# Patient Record
Sex: FEMALE | Race: WHITE | NOT HISPANIC OR LATINO | Employment: STUDENT | URBAN - METROPOLITAN AREA
[De-identification: names, ages, dates, MRNs, and addresses within clinical notes are randomized per-mention and may not be internally consistent; named-entity substitution may affect disease eponyms.]

---
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
## 2021-03-10 ENCOUNTER — ANNUAL EXAM (OUTPATIENT)
Dept: OBGYN CLINIC | Facility: CLINIC | Age: 21
End: 2021-03-10
Payer: COMMERCIAL

## 2021-03-10 VITALS
WEIGHT: 153.6 LBS | DIASTOLIC BLOOD PRESSURE: 86 MMHG | HEIGHT: 68 IN | SYSTOLIC BLOOD PRESSURE: 120 MMHG | BODY MASS INDEX: 23.28 KG/M2

## 2021-03-10 DIAGNOSIS — Z30.41 ENCOUNTER FOR SURVEILLANCE OF CONTRACEPTIVE PILLS: ICD-10-CM

## 2021-03-10 DIAGNOSIS — Z01.419 ENCOUNTER FOR GYNECOLOGICAL EXAMINATION (GENERAL) (ROUTINE) WITHOUT ABNORMAL FINDINGS: Primary | ICD-10-CM

## 2021-03-10 PROCEDURE — 99395 PREV VISIT EST AGE 18-39: CPT | Performed by: NURSE PRACTITIONER

## 2021-03-10 PROCEDURE — G0145 SCR C/V CYTO,THINLAYER,RESCR: HCPCS | Performed by: NURSE PRACTITIONER

## 2021-03-10 RX ORDER — NORETHINDRONE AND ETHINYL ESTRADIOL 7 DAYS X 3
KIT ORAL
Qty: 90 TABLET | Refills: 3 | Status: SHIPPED | OUTPATIENT
Start: 2021-03-10 | End: 2022-03-22

## 2021-03-10 NOTE — PROGRESS NOTES
Encounter for gynecological examination (general) (routine) without abnormal findings    Normal findings on routine annual gyn exam  Recommended monthly SBE, annual CBE  Reviewed ASCCP guidelines and first pap collected today  The patient reports she completed Gardasil series  STI testing was offered and declined at this time; the patient is aware that condoms are recommended for all sexual contact for prevention of STI  The patient likes current contraceptive measure and desires to continue  Reviewed diet/activity recommendations and reasons to call  F/u in one year for routine annual gyn exam or sooner PRN  Encounter for surveillance of contraceptive pills    Happy with current OCP  Denies ACHES,  nausea or other side effects  Having BTB this month-denies missing any pills  Advised to call if happens with next pack for OCP change  Refill given for one year  RTO 1 year or prn problems or concerns  Diagnoses and all orders for this visit:    Encounter for gynecological examination (general) (routine) without abnormal findings  -     Liquid-based pap, screening    Encounter for surveillance of contraceptive pills  -     Necon 7/7/7 0 5/0 75/1-35 MG-MCG per tablet; Take one tablet by mouth daily         Elina Pfeiffer  2000    CC:  Yearly exam    S:  Macarena Whatley is a 23 y o  female here for yearly exam  She denies breast concerns, abdominal/pelvic pain, abnormal vaginal discharge or bladder/bowel dysfunction  Her cycles are regular, not heavy or painful on OCP  Having BTB this month-denies missing any pills  Sexual activity: She is sexually active without pain, bleeding or dryness  Contraception:  She uses OCP  for contraception  STD testing:  She does not request STD testing today  Gardasil:  She has had the Gardasil series         Last Pap: first today     Family hx of breast cancer:  PGM  Family hx of ovarian cancer: denies  Family hx of colon cancer: denies    She is graduating from [employer] this year and has a full time remote positron in [employer]      Current Outpatient Medications:     Necon 7/7/7 0 5/0 75/1-35 MG-MCG per tablet, Take one tablet by mouth daily, Disp: 90 tablet, Rfl: 3  Social History     Socioeconomic History    Marital status: Single     Spouse name: Not on file    Number of children: Not on file    Years of education: Not on file    Highest education level: Not on file   Occupational History    Not on file   Social Needs    Financial resource strain: Not on file    Food insecurity     Worry: Not on file     Inability: Not on file    Transportation needs     Medical: Not on file     Non-medical: Not on file   Tobacco Use    Smoking status: Never Smoker    Smokeless tobacco: Never Used   Substance and Sexual Activity    Alcohol use: Not Currently    Drug use: No    Sexual activity: Yes     Partners: Male     Birth control/protection: OCP   Lifestyle    Physical activity     Days per week: Not on file     Minutes per session: Not on file    Stress: Not on file   Relationships    Social connections     Talks on phone: Not on file     Gets together: Not on file     Attends Tenriism service: Not on file     Active member of club or organization: Not on file     Attends meetings of clubs or organizations: Not on file     Relationship status: Not on file    Intimate partner violence     Fear of current or ex partner: Not on file     Emotionally abused: Not on file     Physically abused: Not on file     Forced sexual activity: Not on file   Other Topics Concern    Not on file   Social History Narrative    Not on file     Family History   Problem Relation Age of Onset    Obesity Mother     No Known Problems Father     No Known Problems Sister     Diabetes Maternal Grandmother     Hyperlipidemia Maternal Grandmother     Hypertension Maternal Grandmother     Heart disease Maternal Grandfather     Breast cancer Paternal Grandmother    Mario Olivier Heart disease Paternal Grandfather      History reviewed  No pertinent past medical history  Review of Systems   Constitutional: Negative for appetite change, fatigue and unexpected weight change  Respiratory: Negative for shortness of breath  Cardiovascular: Negative for chest pain and leg swelling  Breasts:  negative for tenderness or masses  Gastrointestinal: Negative for abdominal pain  Endocrine: Negative for cold intolerance and heat intolerance  Genitourinary: Negative for dyspareunia, dysuria, flank pain, frequency, genital sores, hematuria, menstrual problem and pelvic pain  Musculoskeletal: Negative for back pain  Neurological: Negative for headaches  O:  Blood pressure 120/86, height 5' 8" (1 727 m), weight 69 7 kg (153 lb 9 6 oz), last menstrual period 02/17/2021  Patient appears well and is not in distress  ROM normal   Neck is supple without masses  Normal thyroid  Heart regular rate and rhythm  Lungs CTA bilaterally   Breasts are symmetrical without mass, tenderness, nipple discharge, skin changes or adenopathy  Abdomen is soft and nontender without masses  External genitals are normal without lesions or rashes  Urethral meatus and urethra are normal  Bladder is normal to palpation  Vagina is normal without discharge or bleeding  Cervix is normal without discharge or lesion  Uterus is normal, mobile, nontender without palpable mass  Adnexa are normal, nontender, without palpable mass     Skin warm and dry  Capillary refill < 2 seconds  Alert and oriented x 3 with normal affect

## 2021-03-10 NOTE — ASSESSMENT & PLAN NOTE
Happy with current OCP  Denies ACHES,  nausea or other side effects  Having BTB this month-denies missing any pills  Advised to call if happens with next pack for OCP change  Refill given for one year  RTO 1 year or prn problems or concerns

## 2021-03-10 NOTE — ASSESSMENT & PLAN NOTE
Normal findings on routine annual gyn exam  Recommended monthly SBE, annual CBE  Reviewed ASCCP guidelines and first pap collected today  The patient reports she completed Gardasil series  STI testing was offered and declined at this time; the patient is aware that condoms are recommended for all sexual contact for prevention of STI  The patient likes current contraceptive measure and desires to continue  Reviewed diet/activity recommendations and reasons to call  F/u in one year for routine annual gyn exam or sooner PRN

## 2021-03-16 ENCOUNTER — TELEPHONE (OUTPATIENT)
Dept: OBGYN CLINIC | Facility: MEDICAL CENTER | Age: 21
End: 2021-03-16

## 2021-03-16 LAB
LAB AP GYN PRIMARY INTERPRETATION: NORMAL
Lab: NORMAL

## 2022-02-02 ENCOUNTER — OFFICE VISIT (OUTPATIENT)
Dept: OBGYN CLINIC | Facility: CLINIC | Age: 22
End: 2022-02-02
Payer: COMMERCIAL

## 2022-02-02 VITALS — SYSTOLIC BLOOD PRESSURE: 120 MMHG | BODY MASS INDEX: 23.39 KG/M2 | WEIGHT: 153.8 LBS | DIASTOLIC BLOOD PRESSURE: 74 MMHG

## 2022-02-02 DIAGNOSIS — N94.10 DYSPAREUNIA, FEMALE: Primary | ICD-10-CM

## 2022-02-02 PROCEDURE — 99213 OFFICE O/P EST LOW 20 MIN: CPT | Performed by: NURSE PRACTITIONER

## 2022-02-02 RX ORDER — NORETHINDRONE ACETATE AND ETHINYL ESTRADIOL 1MG-20(21)
1 KIT ORAL DAILY
Qty: 90 TABLET | Refills: 1 | Status: SHIPPED | OUTPATIENT
Start: 2022-02-02 | End: 2022-03-23 | Stop reason: SDUPTHER

## 2022-02-02 NOTE — PROGRESS NOTES
Assessment/Plan:    Dyspareunia, female  Tenderness noted on deep palpation of RLQ  Garrett Miller Pelvic U/S ordered  Declines STI testing  If pelvic U/S normal she will RTO for STI testing  She wants to try different OCP  Loestrin ordered  Advised to to to ED if pain worsens or develops fever/chills  Diagnoses and all orders for this visit:    Dyspareunia, female  -     US pelvis complete w transvaginal; Future  -     norethindrone-ethinyl estradiol (Loestrin Fe 1/20) 1-20 MG-MCG per tablet; Take 1 tablet by mouth daily        Subjective:      Patient ID: Mckayla Hardy is a 24 y o  female  Alesha Piper is a 24year old who presents with complaint of RLQ pain and pain with intercourse x 1 month  Pain is sharp, lasts a few seconds to minutes and occurs with deep penetration and bearing down  She denies fever, chills, abdominal pain, changes in bowel habits, UTI symptoms or unusual vaginal discharge, itching or odor  She is sexually active with one male partner and uses OCP for [facility]  She has been on Ortho tricyclen for years and is having more cramping and wants to change OPC  She declines STI testing  Will current partner x 1 year  The following portions of the patient's history were reviewed and updated as appropriate: allergies, current medications, past family history, past medical history, past social history, past surgical history and problem list     Review of Systems   Constitutional: Negative for chills, fatigue and fever  HENT: Negative  Eyes: Negative  Respiratory: Negative  Gastrointestinal: Negative  Endocrine: Negative  Genitourinary: Positive for dyspareunia and pelvic pain  Negative for difficulty urinating, dysuria, flank pain, frequency, menstrual problem, vaginal bleeding and vaginal discharge  Musculoskeletal: Negative  Skin: Negative  Neurological: Negative  Negative for dizziness and headaches  Psychiatric/Behavioral: Negative            Objective:      /74 (BP Location: Right arm, Patient Position: Sitting, Cuff Size: Standard)   Wt 69 8 kg (153 lb 12 8 oz)   LMP 01/17/2022   BMI 23 39 kg/m²          Physical Exam  Constitutional:       Appearance: Normal appearance  She is normal weight  Pulmonary:      Effort: Pulmonary effort is normal    Genitourinary:     Labia:         Right: No rash, tenderness, lesion or injury  Left: No rash, tenderness, lesion or injury  Vagina: No signs of injury and foreign body  No vaginal discharge, erythema, tenderness or bleeding  Cervix: No cervical motion tenderness, discharge, friability, lesion, erythema or cervical bleeding  Uterus: Not deviated, not enlarged, not fixed, not tender and no uterine prolapse  Adnexa:         Right: Tenderness present  No mass or fullness  Left: No mass, tenderness or fullness  Skin:     General: Skin is warm and dry  Capillary Refill: Capillary refill takes less than 2 seconds  Neurological:      Mental Status: She is alert and oriented to person, place, and time     Psychiatric:         Mood and Affect: Mood normal          Behavior: Behavior normal

## 2022-02-02 NOTE — ASSESSMENT & PLAN NOTE
Tenderness noted on deep palpation of RLQ  Jovanni Richardson Pelvic U/S ordered  Declines STI testing  If pelvic U/S normal she will RTO for STI testing  She wants to try different OCP  Loestrin ordered  Advised to to to ED if pain worsens or develops fever/chills

## 2022-02-03 ENCOUNTER — HOSPITAL ENCOUNTER (OUTPATIENT)
Dept: RADIOLOGY | Age: 22
Discharge: HOME | End: 2022-02-03
Payer: COMMERCIAL

## 2022-02-03 DIAGNOSIS — N94.10 DYSPAREUNIA, FEMALE: ICD-10-CM

## 2022-02-03 PROCEDURE — 76830 TRANSVAGINAL US NON-OB: CPT

## 2022-02-03 PROCEDURE — 76856 US EXAM PELVIC COMPLETE: CPT

## 2022-02-07 ENCOUNTER — TELEPHONE (OUTPATIENT)
Dept: OBGYN CLINIC | Facility: MEDICAL CENTER | Age: 22
End: 2022-02-07

## 2022-02-07 NOTE — TELEPHONE ENCOUNTER
She has a right ovarian simple cyst that may be causing her pain  Would repeat imaging at beginning of next cycle if still has pain to see if smaller    If pain resolves no need

## 2022-02-07 NOTE — TELEPHONE ENCOUNTER
spoke to pt and she said she is not sure when her next cycle will be due  She is leaving for [place] and will be gone for a little bit  She said if she is having any pain she will call for the US order

## 2022-03-03 ENCOUNTER — TELEPHONE (OUTPATIENT)
Dept: OBGYN CLINIC | Facility: CLINIC | Age: 22
End: 2022-03-03

## 2022-03-03 NOTE — TELEPHONE ENCOUNTER
Wants to confirm billing went to primary insurance [address] [place] [address] is Secondary  Received a bill for $139 that [facility] did not cover  Would like to make sure [address]  was billed too

## 2022-03-23 ENCOUNTER — ANNUAL EXAM (OUTPATIENT)
Dept: OBGYN CLINIC | Facility: CLINIC | Age: 22
End: 2022-03-23
Payer: COMMERCIAL

## 2022-03-23 VITALS
HEIGHT: 68 IN | WEIGHT: 151.6 LBS | SYSTOLIC BLOOD PRESSURE: 100 MMHG | DIASTOLIC BLOOD PRESSURE: 72 MMHG | BODY MASS INDEX: 22.97 KG/M2

## 2022-03-23 DIAGNOSIS — N94.10 DYSPAREUNIA, FEMALE: ICD-10-CM

## 2022-03-23 DIAGNOSIS — Z80.3 FAMILY HISTORY OF BREAST CANCER: ICD-10-CM

## 2022-03-23 DIAGNOSIS — Z01.419 ENCOUNTER FOR GYNECOLOGICAL EXAMINATION (GENERAL) (ROUTINE) WITHOUT ABNORMAL FINDINGS: Primary | ICD-10-CM

## 2022-03-23 DIAGNOSIS — Z30.41 ENCOUNTER FOR SURVEILLANCE OF CONTRACEPTIVE PILLS: ICD-10-CM

## 2022-03-23 PROCEDURE — 99395 PREV VISIT EST AGE 18-39: CPT | Performed by: NURSE PRACTITIONER

## 2022-03-23 RX ORDER — NORETHINDRONE ACETATE AND ETHINYL ESTRADIOL 1MG-20(21)
1 KIT ORAL DAILY
Qty: 90 TABLET | Refills: 3 | Status: SHIPPED | OUTPATIENT
Start: 2022-03-23

## 2022-03-25 NOTE — TELEPHONE ENCOUNTER
This has been sent back to billing and [address] [place] [address]   is now on as primary insurance

## 2022-05-26 ENCOUNTER — TELEPHONE (OUTPATIENT)
Dept: OBGYN CLINIC | Facility: CLINIC | Age: 22
End: 2022-05-26

## 2022-05-26 NOTE — TELEPHONE ENCOUNTER
Pt is currently on junel and would like to switch back to naveen shaw    Had an annual with shahrzad on 3/23/22      Please advise

## 2022-08-11 NOTE — TELEPHONE ENCOUNTER
Pt had been on Nortrel for 6 yrs - had some spotting and was switched to Loestrin 1/20 fe,  She has been on this now for 6 mos and is constantly spotting  Very parra  She wants to be switched to another pill   She says she takes pills on time, etc  What suggestons?

## 2022-08-12 ENCOUNTER — OFFICE VISIT (OUTPATIENT)
Dept: OBGYN CLINIC | Facility: MEDICAL CENTER | Age: 22
End: 2022-08-12
Payer: COMMERCIAL

## 2022-08-12 VITALS
SYSTOLIC BLOOD PRESSURE: 112 MMHG | WEIGHT: 158 LBS | DIASTOLIC BLOOD PRESSURE: 70 MMHG | HEIGHT: 68 IN | BODY MASS INDEX: 23.95 KG/M2

## 2022-08-12 DIAGNOSIS — Z30.41 ENCOUNTER FOR SURVEILLANCE OF CONTRACEPTIVE PILLS: Primary | ICD-10-CM

## 2022-08-12 DIAGNOSIS — N92.1 BREAKTHROUGH BLEEDING: ICD-10-CM

## 2022-08-12 PROCEDURE — 99213 OFFICE O/P EST LOW 20 MIN: CPT | Performed by: STUDENT IN AN ORGANIZED HEALTH CARE EDUCATION/TRAINING PROGRAM

## 2022-08-12 RX ORDER — LEVONORGESTREL / ETHINYL ESTRADIOL AND ETHINYL ESTRADIOL 150-30(84)
1 KIT ORAL DAILY
Qty: 91 TABLET | Refills: 3 | Status: SHIPPED | OUTPATIENT
Start: 2022-08-12

## 2022-08-12 RX ORDER — CETIRIZINE HYDROCHLORIDE 10 MG/1
10 TABLET ORAL DAILY
COMMUNITY

## 2022-08-12 NOTE — PROGRESS NOTES
Assessment/Plan:      Diagnoses and all orders for this visit:    Encounter for surveillance of contraceptive pills  Discussed several options: OCP, nuvaring, patch, IUD  Would like to try a different pill  Will try Seasonique  If continued breakthrough bleeding discussed option of a pill break for a few months, but would be hesitant to not have contraception on board  May want to switch methods if this doesn't work  F/u for annual   -     Levonorgest-Eth Estrad 91-Day (Seasonique) 0 15-0 03 &0 01 MG TABS; Take 1 tablet by mouth in the morning    Breakthrough bleeding  -     Levonorgest-Eth Estrad 91-Day (Seasonique) 0 15-0 03 &0 01 MG TABS; Take 1 tablet by mouth in the morning    Other orders  -     cetirizine (ZyrTEC) 10 mg tablet; Take 10 mg by mouth daily          Subjective:     Patient ID: Lilian Chiu is a 20 y o  female  21 yo G0 presents to discuss breakthrough bleeding on OCP  Prior to March was on Nortrel for about 7 years  She was having breakthrough bleeding and was switch to Loestrin  She takes cyclically  She continues to have monthly breakthrough bleeding  She originally started taking the pill for menses control but now is using it primarily for contraception  No recent changes in health or family history  Review of Systems   All other systems reviewed and are negative  Objective:     Physical Exam  Vitals reviewed  Pulmonary:      Effort: Pulmonary effort is normal    Neurological:      Mental Status: She is alert and oriented to person, place, and time     Psychiatric:         Behavior: Behavior normal

## 2023-03-28 ENCOUNTER — ANNUAL EXAM (OUTPATIENT)
Dept: OBGYN CLINIC | Facility: MEDICAL CENTER | Age: 23
End: 2023-03-28

## 2023-03-28 VITALS
WEIGHT: 155.2 LBS | HEIGHT: 69 IN | BODY MASS INDEX: 22.99 KG/M2 | SYSTOLIC BLOOD PRESSURE: 110 MMHG | DIASTOLIC BLOOD PRESSURE: 80 MMHG

## 2023-03-28 DIAGNOSIS — Z01.419 ENCOUNTER FOR ANNUAL ROUTINE GYNECOLOGICAL EXAMINATION: Primary | ICD-10-CM

## 2023-03-28 DIAGNOSIS — Z80.3 FAMILY HISTORY OF BREAST CANCER: ICD-10-CM

## 2023-03-28 RX ORDER — DOXYCYCLINE 50 MG/1
CAPSULE ORAL
COMMUNITY
Start: 2023-03-22

## 2023-03-28 NOTE — PROGRESS NOTES
Assessment/Plan:    21 yo G0 - annual exam      Problem List Items Addressed This Visit        Other    Family history of breast cancer    Relevant Orders    Ambulatory Referral to Oncology Genetics   Other Visit Diagnoses     Encounter for annual routine gynecological examination    -  Primary  Pap taken this year due to cervical erythema and friability - likely ectropion on OCP  Discussed continuous OCP to avoid premenstrual symptoms  Will consider  F/u in 1 year or prn            Subjective:      Patient ID: Nan Lynch is a 20 y o  female  This is a 20 y o   with LMP: around 22  Patient is premenopausal     Concerns: would like to discuss balancing her hormones because she has breakouts/cold sores and mood swings when she is about to get her period  Contraception: Seasonique  Periods: are regular, getting every 3 months on [place], they are light  Sexually active: yes,  w/o issue  STD testing: no  Urinary concerns: none  Bowel movements: no changes     Screening:  Last pap smear: 3/10/21-neg  Gardisil vaccine: UTD    Family history:   Breast cancer: grandmother - in her 45s  Sister had genetic testing  She is interested in testing  Ovarian cancer: none  Colon cancer: none    Body mass index is 22 92 kg/m²  Exercise: yes  Diet:healthy  Smoking: non smoker    No mood concerns  The following portions of the patient's history were reviewed and updated as appropriate: allergies, current medications, past family history, past medical history, past social history, past surgical history and problem list     Review of Systems   Constitutional: Negative  HENT: Negative  Eyes: Negative  Respiratory: Negative  Cardiovascular: Negative  Gastrointestinal: Negative  Endocrine: Negative  Genitourinary: Negative for dyspareunia, dysuria, frequency, menstrual problem, pelvic pain, vaginal discharge and vaginal pain  Musculoskeletal: Negative  Skin: Negative  "  Allergic/Immunologic: Negative  Neurological: Negative  Hematological: Negative  Psychiatric/Behavioral: Negative  Objective:      /80 (BP Location: Left arm, Patient Position: Sitting, Cuff Size: Adult)   Ht 5' 9\" (1 753 m)   Wt 70 4 kg (155 lb 3 2 oz)   LMP 02/25/2023 (Approximate)   BMI 22 92 kg/m²          Physical Exam  Vitals reviewed  Cardiovascular:      Rate and Rhythm: Normal rate  Pulmonary:      Effort: Pulmonary effort is normal    Chest:   Breasts:     Breasts are symmetrical       Right: No mass, nipple discharge, skin change or tenderness  Left: No mass, nipple discharge, skin change or tenderness  Abdominal:      Palpations: Abdomen is soft  Genitourinary:     Labia:         Right: No rash  Left: No rash  Vagina: Normal  No signs of injury  Cervix: Friability and erythema present  No cervical motion tenderness, discharge or lesion  Uterus: Not deviated, not enlarged, not fixed and not tender  Adnexa:         Right: No mass, tenderness or fullness  Left: No mass, tenderness or fullness  Comments: ?cervical ectropion  Musculoskeletal:      Cervical back: Normal range of motion  Skin:     General: Skin is warm and dry  Neurological:      Mental Status: She is alert and oriented to person, place, and time     Psychiatric:         Behavior: Behavior normal          "

## 2023-03-29 ENCOUNTER — TELEPHONE (OUTPATIENT)
Dept: GENETICS | Facility: CLINIC | Age: 23
End: 2023-03-29

## 2023-03-29 NOTE — TELEPHONE ENCOUNTER
Donna Fontanez called to schedule a new patient appointment with the Cancer Risk and Genetics Program       Outcome:  Genetics appointment scheduled for 8/15 at 36 via televisit    Personal/Family History Related to Appointment:  fhx of breast cancer  Non-Alevism  History of Genetic Testing:  Patient reports family history of genetic testing   sister had prior genetic testing - negative    Genetics Family History Questionnaire:  I confirmed the patient's e-mail on file as the best e-mail to send an invite link for our genetics family history intake

## 2023-03-29 NOTE — TELEPHONE ENCOUNTER
I called Elina to schedule a new patient appointment with the Cancer Risk and Genetics Program       Outcome:   I left a voice message encouraging the patient to call the genetics team at [phone] to schedule this appointment  Follow-up:   At this time the referral will be closed and we will wait to hear back from the patient regarding scheduling this appointment

## 2023-04-05 LAB
LAB AP GYN PRIMARY INTERPRETATION: NORMAL
Lab: NORMAL

## 2023-08-10 DIAGNOSIS — N92.1 BREAKTHROUGH BLEEDING: ICD-10-CM

## 2023-08-10 DIAGNOSIS — Z30.41 ENCOUNTER FOR SURVEILLANCE OF CONTRACEPTIVE PILLS: ICD-10-CM

## 2023-08-10 RX ORDER — LEVONORGESTREL / ETHINYL ESTRADIOL AND ETHINYL ESTRADIOL 150-30(84)
1 KIT ORAL EVERY MORNING
Qty: 91 TABLET | Refills: 3 | Status: SHIPPED | OUTPATIENT
Start: 2023-08-10

## 2023-08-14 ENCOUNTER — TELEPHONE (OUTPATIENT)
Dept: GENETICS | Facility: CLINIC | Age: 23
End: 2023-08-14

## 2023-08-15 ENCOUNTER — CLINICAL SUPPORT (OUTPATIENT)
Dept: GENETICS | Facility: CLINIC | Age: 23
End: 2023-08-15

## 2023-08-15 ENCOUNTER — DOCUMENTATION (OUTPATIENT)
Dept: GENETICS | Facility: CLINIC | Age: 23
End: 2023-08-15

## 2023-08-15 DIAGNOSIS — Z80.3 FAMILY HISTORY OF BREAST CANCER: Primary | ICD-10-CM

## 2023-08-15 NOTE — LETTER
from Last 1 Encounters:   23 70.4 kg (155 lb 3.2 oz)       Relevant Family History   Patient reports no Ashkenazi Jainism ancestry. Maternal Family History:  Grandmother (d.42) bilateral breast cancer (dx 43)  First cousin once removed through unaffected great aunt with history of breast cancer    Paternal Family History:   No reported family history of cancer     Please refer to the scanned pedigree in the Media Tab for a complete family history     *All history is reported as provided by the patient; records are not available for review, except where indicated. Assessment:  We discussed sporadic, familial and hereditary cancer. We also discussed the many factors that influence our risk for cancer such as age, environmental exposures, lifestyle choices and family history. We reviewed the indications suggestive of a hereditary predisposition to cancer. Enrique Lovelace is unaffected, but is considering genetic testing due to a family history of breast cancer. Although Elina's maternal grandmother is the most informative person to undergo genetic testing, Enrique Lovelace can consider genetic testing due to the following:   Patient does not have cancer but is the most informative person to test because their maternal grandmother is . Genetic testing is indicated for Elina based on the following criteria: Meets NCCN V2.2023 Testing Criteria for High-Penetrance Breast Cancer Susceptibility Genes: Maternal grandmother with bilateral breast cancer diagnosed under the age of 48    The risks, benefits, and limitations of genetic testing were reviewed with the patient, as well as genetic discrimination laws, and possible test results (positive, negative, variants of uncertain significance) and their clinical implications. If positive for a mutation, options for managing cancer risk including increased surveillance, chemoprevention, and in some cases prophylactic surgery were discussed.  Elina was informed that if a hereditary cancer syndrome was identified in her, first degree relatives (parents, siblings, and children) have a chance of also inheriting the condition. Genetic testing would allow for predictive genetic testing in other relatives, who may also be at risk depending on their degree of relation. Billing:  Most individuals pay <$100 for hereditary cancer genetic testing. If insurance covers the cost of the testing, individuals may still pay out of pocket secondary to co-pays, co-insurance, or deductibles. If the cost of the testing exceeds $100, the lab will reach out to the patient via phone or e-mail. The patient will then have the option to proceed with the testing, cancel the testing, or elect the self-pay option of $250. Elina verbalized understanding. Plan: Patient decided to proceed with testing and provided consent. Summary:     Sample Collection:  A blood kit was mailed home to the patient    Genetic Testing Preformed: [employer] Cancer Panel (47 genes): APC, SINAN, AXIN2, BARD1, BMPR1A, BRCA1, BRCA2, BRIP1, CDH1, CDK4, CDKN2A, CHEK2, CTNNA1, DICER1, EPCAM, GREM1, HOXB13, KIT, MEN1, MLH1, MSH2, MSH3, MSH6, MUTYH, NBN, NF1, NTHL1, PALB2, PDGFRA, PMS2, POLD1, POLE, PTEN, RAD50, RAD51C, RAD51D, SDHA, SDHB, SDHC, SDHD, SMAD4, SMARCA4, STK11, TP53, TSC1, TSC2, VHL      Result Call Information:  In the event that we need to reach [facility] via telephone:  I confirmed the patient's mobile number on file as the best number to call with results  I confirmed with the patient that we can leave a voicemail on the provided numbers    Results take approximately 2-3 weeks to complete once test is started. [facility] will be notified via [employer] once results are available. Additional recommendations for surveillance/medical management will be made pending genetic test results.

## 2023-08-17 ENCOUNTER — TRANSCRIBE ORDERS (OUTPATIENT)
Dept: LAB | Facility: CLINIC | Age: 23
End: 2023-08-17

## 2023-08-17 ENCOUNTER — APPOINTMENT (OUTPATIENT)
Dept: LAB | Facility: CLINIC | Age: 23
End: 2023-08-17
Payer: COMMERCIAL

## 2023-08-17 DIAGNOSIS — Z80.3 FAMILY HISTORY OF MALIGNANT NEOPLASM OF BREAST: ICD-10-CM

## 2023-08-17 DIAGNOSIS — Z80.3 FAMILY HISTORY OF MALIGNANT NEOPLASM OF BREAST: Primary | ICD-10-CM

## 2023-08-17 PROCEDURE — 36415 COLL VENOUS BLD VENIPUNCTURE: CPT

## 2025-03-17 NOTE — PROGRESS NOTES
Pre-Test Genetic Counseling Consult Note    Patient Name: Zoran Dominguez   /Age: 2000/ y.o. Referring Provider: Cali Harper MD    Date of Service: 8/15/2023  Genetic Counselor: Albino Begum MS, [facility]   Genetic Counseling Student: Breonna Scott, genetic counseling student completed today's appointment under my supervision  Interpretation Services: None  Location: Telephone consult   Length of Visit: [address] [facility] was referred to the [address] [place], [place] [address] and Genetic Assessment Program due to her family history of breast cancer. she presents today to discuss the possibility of a hereditary cancer syndrome, options for genetic testing, and implications for her and her family. She attended the appointment alone. Cancer History and Treatment:   Personal History: no personal history of cancer    Screening Hx:   Breast:  Breast Imaging: none    Colon:  Colonoscopy: none    Gynecologic:  Ovaries and Uterus intact     Skin:  No current screening    Other screening:     Reproductive History  Age at menarche: 14  Age at first live birth: Nulliparous  Menopause: Premenopausal  Hormone replacement: none    Medical and Surgical History  Pertinent surgical history:   Past Surgical History:   Procedure Laterality Date   • MOUTH SURGERY     • WISDOM TOOTH EXTRACTION        Pertinent medical history:No past medical history on file. Other History:  Height:   Ht Readings from Last 1 Encounters:   23 5' 9" (1.753 m)     Weight:   Wt Readings from Last 1 Encounters:   23 70.4 kg (155 lb 3.2 oz)       Relevant Family History   Patient reports no Ashkenazi [employer]ar[employer] ancestry.      Maternal Family History:  Grandmother (d.42) bilateral breast cancer (dx 43)  First cousin once removed through unaffected great aunt with history of breast cancer    Paternal Family History:   No reported family history of cancer     Please refer to the scanned pedigree in the Media Tab for a complete family history     *All history is reported as provided by the patient; records are not available for review, except where indicated. Assessment:  We discussed sporadic, familial and hereditary cancer. We also discussed the many factors that influence our risk for cancer such as age, environmental exposures, lifestyle choices and family history. We reviewed the indications suggestive of a hereditary predisposition to cancer. Isi Sweet is unaffected, but is considering genetic testing due to a family history of breast cancer. Although Elina's maternal grandmother is the most informative person to undergo genetic testing, Isi wSeet can consider genetic testing due to the following:   Patient does not have cancer but is the most informative person to test because their maternal grandmother is . Genetic testing is indicated for Elina based on the following criteria: Meets NCCN V2.2023 Testing Criteria for High-Penetrance Breast Cancer Susceptibility Genes: Maternal grandmother with bilateral breast cancer diagnosed under the age of 48    The risks, benefits, and limitations of genetic testing were reviewed with the patient, as well as genetic discrimination laws, and possible test results (positive, negative, variants of uncertain significance) and their clinical implications. If positive for a mutation, options for managing cancer risk including increased surveillance, chemoprevention, and in some cases prophylactic surgery were discussed. Isi Sweet was informed that if a hereditary cancer syndrome was identified in her, first degree relatives (parents, siblings, and children) have a chance of also inheriting the condition. Genetic testing would allow for predictive genetic testing in other relatives, who may also be at risk depending on their degree of relation. Billing:  Most individuals pay <$100 for hereditary cancer genetic testing.  If insurance covers the cost of the testing, individuals may still pay out of pocket secondary to co-pays, co-insurance, or deductibles. If the cost of the testing exceeds $100, the lab will reach out to the patient via phone or e-mail. The patient will then have the option to proceed with the testing, cancel the testing, or elect the self-pay option of $250. Elina verbalized understanding. Plan: Patient decided to proceed with testing and provided consent. Summary:     Sample Collection:  A blood kit was mailed home to the patient    Genetic Testing Preformed: [employer] Cancer Panel (47 genes): APC, SINAN, AXIN2, BARD1, BMPR1A, BRCA1, BRCA2, BRIP1, CDH1, CDK4, CDKN2A, CHEK2, CTNNA1, DICER1, EPCAM, GREM1, HOXB13, KIT, MEN1, MLH1, MSH2, MSH3, MSH6, MUTYH, NBN, NF1, NTHL1, PALB2, PDGFRA, PMS2, POLD1, POLE, PTEN, RAD50, RAD51C, RAD51D, SDHA, SDHB, SDHC, SDHD, SMAD4, SMARCA4, STK11, TP53, TSC1, TSC2, VHL      Result Call Information:  In the event that we need to reach [facility] via telephone:  I confirmed the patient's mobile number on file as the best number to call with results  I confirmed with the patient that we can leave a voicemail on the provided numbers    Results take approximately 2-3 weeks to complete once test is started. [facility] will be notified via [employer] once results are available. Additional recommendations for surveillance/medical management will be made pending genetic test results. [[id number]]